# Patient Record
(demographics unavailable — no encounter records)

---

## 2024-11-09 NOTE — HISTORY OF PRESENT ILLNESS
[de-identified] : Patient is a 29-year-old right-hand-dominant female injured her right hand on October 30, 2024.  Patient states she punched a desk at that time.  That since that time to complain of pain and swelling.  She is wearing a brace as well as rolo tape.

## 2024-11-09 NOTE — ASSESSMENT
[FreeTextEntry1] : Right hand contusion possible hairline fracture fifth metacarpal  Plan anti-inflammatories with GI precautions ice 20 minutes on and 20 minutes off I do recommend continuing the wrist support splint that she is comfortable in it.  I did discuss with the patient that time is a possible hairline fracture one of the x-ray views on the opposite oblique but I do not see any on the additional 3 views.  I did advise the possibly of a bone bruise versus a small hairline fracture at this point as long as she is comfortable in the brace I do recommend wearing it.  I did advise her to follow-up in 2 to 3 weeks for reevaluation.  I did advise her still his discomfort at that time is most likely a fracture but the treatment would not change other than I do recommend she not punch anything or hit anything.  If it gets worse she will be seen sooner.  All questions were answered she is agreeable with the plan.  Patient was advised if they develop any severe pain, numbness or tingling or pain with range of motion to the fingers they must call or go to the emergency room immediately. All the signs and symptoms of compartment syndrome were explained.  The patient understands.  This medical record was created utilizing Dragon voice recognition software.  This software is not perfect and may occasionally create typographical errors which may be reflected in the above medical record.

## 2024-11-09 NOTE — IMAGING
[Right] : right hand [de-identified] : She is alert and oriented vital signs are stable.  Examination right hand reveals some mild swelling on the dorsal aspect the right hand around the fifth metacarpal phalangeal joint.  She has no tenderness on the scaphoid volarly or dorsally or the scaphoid ligament.  She has full range of motion of the wrist.  She has tenderness around the fifth metacarpal around the metacarpal head neck shaft region.  No obvious deformity.  The flexors and extensors are intact.  She is able to make a fist.  No extension lag.  No rotational malalignment.  She is a normal neurologic exam Normal vascular exam. Normal skin exam. No evidence for open wounds infection or compartment syndrome. [FreeTextEntry1] : X-rays right hand 3 views plus and opposite oblique view a possible hairline fracture on the opposite oblique view of the fifth metacarpal shaft

## 2024-12-03 NOTE — DISCUSSION/SUMMARY
[de-identified] : Discussed the nature of the diagnosis and risk and benefits of different modalities of treatment. RT hand contusion  She is doing well Pt was reassured All questions answered  PRN

## 2024-12-03 NOTE — HISTORY OF PRESENT ILLNESS
[3] : 3 [0] : 0 [Dull/Aching] : dull/aching [Intermittent] : intermittent [Household chores] : household chores [Leisure] : leisure [de-identified] : 29 year old female sustained punch type injury to RIGHT hand.   Initially had sharp pain, but in past2-3 weeks, pain has significantly improved.  Was seen by ortho, who informed possible nondisplaced fracture.  Injury is 27 days ago  DOI 10/30/24 [] : no [FreeTextEntry5] : Pt previously saw Dr. Lyons for the right hand, states that on 10/30/24 she punched a desk and injured her hand. She wears a brace on and off, has been feeling improvement in the last 1.5 weeks

## 2024-12-03 NOTE — DISCUSSION/SUMMARY
[de-identified] : Discussed the nature of the diagnosis and risk and benefits of different modalities of treatment. RT hand contusion  She is doing well Pt was reassured All questions answered  PRN

## 2024-12-03 NOTE — HISTORY OF PRESENT ILLNESS
[3] : 3 [0] : 0 [Dull/Aching] : dull/aching [Intermittent] : intermittent [Household chores] : household chores [Leisure] : leisure [de-identified] : 29 year old female sustained punch type injury to RIGHT hand.   Initially had sharp pain, but in past2-3 weeks, pain has significantly improved.  Was seen by ortho, who informed possible nondisplaced fracture.  Injury is 27 days ago  DOI 10/30/24 [] : no [FreeTextEntry5] : Pt previously saw Dr. Lyons for the right hand, states that on 10/30/24 she punched a desk and injured her hand. She wears a brace on and off, has been feeling improvement in the last 1.5 weeks